# Patient Record
Sex: FEMALE | Race: BLACK OR AFRICAN AMERICAN | NOT HISPANIC OR LATINO | URBAN - METROPOLITAN AREA
[De-identification: names, ages, dates, MRNs, and addresses within clinical notes are randomized per-mention and may not be internally consistent; named-entity substitution may affect disease eponyms.]

---

## 2020-02-10 ENCOUNTER — EMERGENCY (EMERGENCY)
Facility: HOSPITAL | Age: 60
LOS: 0 days | Discharge: HOME | End: 2020-02-10
Payer: SELF-PAY

## 2020-02-10 VITALS
TEMPERATURE: 98 F | OXYGEN SATURATION: 98 % | HEART RATE: 76 BPM | RESPIRATION RATE: 16 BRPM | DIASTOLIC BLOOD PRESSURE: 63 MMHG | WEIGHT: 154.1 LBS | SYSTOLIC BLOOD PRESSURE: 138 MMHG | HEIGHT: 68 IN

## 2020-02-10 DIAGNOSIS — M25.532 PAIN IN LEFT WRIST: ICD-10-CM

## 2020-02-10 DIAGNOSIS — M25.50 PAIN IN UNSPECIFIED JOINT: ICD-10-CM

## 2020-02-10 DIAGNOSIS — M25.531 PAIN IN RIGHT WRIST: ICD-10-CM

## 2020-02-10 PROCEDURE — 73110 X-RAY EXAM OF WRIST: CPT | Mod: 26,50

## 2020-02-10 PROCEDURE — 73030 X-RAY EXAM OF SHOULDER: CPT | Mod: 26,50

## 2020-02-10 PROCEDURE — 99284 EMERGENCY DEPT VISIT MOD MDM: CPT

## 2020-02-10 NOTE — ED ADULT NURSE NOTE - NSIMPLEMENTINTERV_GEN_ALL_ED
Implemented All Universal Safety Interventions:  [place] to call system. Call bell, personal items and telephone within reach. Instruct patient to call for assistance. Room bathroom lighting operational. Non-slip footwear when patient is off stretcher. Physically safe environment: no spills, clutter or unnecessary equipment. Stretcher in lowest position, wheels locked, appropriate side rails in place.

## 2020-02-10 NOTE — ED PROVIDER NOTE - PATIENT PORTAL LINK FT
You can access the FollowMyHealth Patient Portal offered by [facility] by registering at the following website: [email or web address]. By joining [facility]’s FollowMyHealth portal, you will also be able to view your health information using other applications (apps) compatible with our system.

## 2020-02-10 NOTE — ED PROVIDER NOTE - PHYSICAL EXAMINATION
RUE: Mild swelling at the radial, dorsal aspect of the wrist. No evident bony deformity, erythema or ecchymosis. No snuff box tenderness or bony point tenderness. FROM of wrist, elbow and shoulder. No forearm tenderness or bony shoulder tenderness.   LUE: No evident edema, bony deformity, erythema or ecchymosis. No snuff box tenderness or bony point tenderness. FROM of wrist, elbow and shoulder. No forearm tenderness or bony shoulder tenderness.  LLE: FROM and weight bearing without assistance. No bony tenderness or evident deformity.   RLE: FROM and weight bearing without assistance. No bony tenderness or evident deformity.

## 2020-02-10 NOTE — ED PROVIDER NOTE - PROGRESS NOTE DETAILS
Supervising Statement (M wider: I have personally seen and examined this patient.  I have fully participated in the care of this patient. I have reviewed all pertinent clinical information, including history, physical exam, plan and the ACP Fellow's note and agree except as noted.

## 2020-02-10 NOTE — ED PROVIDER NOTE - CLINICAL SUMMARY MEDICAL DECISION MAKING FREE TEXT BOX
60yo female with no pmh presents with bilateral wrist and shoulder pain. Afebrile and well appearing. FROM of bilateral wrist and shoulders. Mild swelling on radial aspect of right, otherwise both wrists unremarkable. Low suspicion for bony pathology but given age and mechanism, will get xrays to r/o bony pathology. Last diclofenac late last night. Current pain 3/10.

## 2020-02-10 NOTE — ED PROVIDER NOTE - NSFOLLOWUPINSTRUCTIONS_ED_ALL_ED_FT
You can take 600mg Motrin every 6 hours OR your Diclofenac as prescribed as needed for pain.   You can use cold or hot compresses for 20 minutes as at time for comfort as needed.  Follow up with your PMD if symptoms do not improve within 1 week.

## 2020-08-17 NOTE — ED PROVIDER NOTE - OBJECTIVE STATEMENT
normal gait and station, no joint deformities present, No joint erythema or warmth
60yo female with no significant pmh presents with bilateral wrist pain and shoulder pain x 1 day after falling yesterday. States she fell on uneven pavement on outstretched hands, more on the left. States that she took diclofenac with significant relief. States that the pain in her right wrist and her bilateral shoulders was worse this morning prompting ED visit. Denies other joint pain, inability to bear weight, numbness/tingling, cold extremities, and other associated sx.
